# Patient Record
Sex: FEMALE | Race: BLACK OR AFRICAN AMERICAN | Employment: FULL TIME | URBAN - METROPOLITAN AREA
[De-identification: names, ages, dates, MRNs, and addresses within clinical notes are randomized per-mention and may not be internally consistent; named-entity substitution may affect disease eponyms.]

---

## 2018-01-12 ENCOUNTER — HOSPITAL ENCOUNTER (EMERGENCY)
Age: 28
Discharge: HOME | End: 2018-01-12
Attending: FAMILY MEDICINE
Payer: MEDICAID

## 2018-01-12 VITALS
WEIGHT: 200 LBS | BODY MASS INDEX: 35.44 KG/M2 | HEIGHT: 63 IN | SYSTOLIC BLOOD PRESSURE: 122 MMHG | TEMPERATURE: 97.9 F | OXYGEN SATURATION: 99 % | DIASTOLIC BLOOD PRESSURE: 72 MMHG | RESPIRATION RATE: 20 BRPM | HEART RATE: 81 BPM

## 2018-01-12 DIAGNOSIS — R10.10 PAIN OF UPPER ABDOMEN: Primary | ICD-10-CM

## 2018-01-12 LAB
ABSOLUTE EOS #: 0 K/UL (ref 0–0.4)
ABSOLUTE IMMATURE GRANULOCYTE: NORMAL K/UL (ref 0–0.3)
ABSOLUTE LYMPH #: 2 K/UL (ref 1–4.8)
ABSOLUTE MONO #: 0.2 K/UL (ref 0.2–0.8)
ALBUMIN SERPL-MCNC: 4.1 G/DL (ref 3.5–5.2)
ALBUMIN/GLOBULIN RATIO: ABNORMAL (ref 1–2.5)
ALP BLD-CCNC: 71 U/L (ref 35–104)
ALT SERPL-CCNC: 6 U/L (ref 5–33)
ANION GAP SERPL CALCULATED.3IONS-SCNC: 13 MMOL/L (ref 9–17)
AST SERPL-CCNC: 12 U/L
BASOPHILS # BLD: 0 % (ref 0–2)
BASOPHILS ABSOLUTE: 0 K/UL (ref 0–0.2)
BILIRUB SERPL-MCNC: 0.57 MG/DL (ref 0.3–1.2)
BUN BLDV-MCNC: 5 MG/DL (ref 6–20)
BUN/CREAT BLD: 7 (ref 9–20)
CALCIUM SERPL-MCNC: 9.2 MG/DL (ref 8.6–10.4)
CHLORIDE BLD-SCNC: 102 MMOL/L (ref 98–107)
CO2: 25 MMOL/L (ref 20–31)
CREAT SERPL-MCNC: 0.69 MG/DL (ref 0.5–0.9)
DIFFERENTIAL TYPE: NORMAL
EOSINOPHILS RELATIVE PERCENT: 1 % (ref 1–4)
GFR AFRICAN AMERICAN: >60 ML/MIN
GFR NON-AFRICAN AMERICAN: >60 ML/MIN
GFR SERPL CREATININE-BSD FRML MDRD: ABNORMAL ML/MIN/{1.73_M2}
GFR SERPL CREATININE-BSD FRML MDRD: ABNORMAL ML/MIN/{1.73_M2}
GLUCOSE BLD-MCNC: 83 MG/DL (ref 70–99)
HCT VFR BLD CALC: 42.7 % (ref 36–46)
HEMOGLOBIN: 14 G/DL (ref 12–16)
IMMATURE GRANULOCYTES: NORMAL %
LIPASE: 17 U/L (ref 13–60)
LYMPHOCYTES # BLD: 36 % (ref 24–44)
MCH RBC QN AUTO: 28 PG (ref 26–34)
MCHC RBC AUTO-ENTMCNC: 32.7 G/DL (ref 31–37)
MCV RBC AUTO: 85.4 FL (ref 80–100)
MONOCYTES # BLD: 4 % (ref 1–7)
PDW BLD-RTO: 13.1 % (ref 11.5–14.5)
PLATELET # BLD: 262 K/UL (ref 130–400)
PLATELET ESTIMATE: NORMAL
PMV BLD AUTO: NORMAL FL (ref 6–12)
POTASSIUM SERPL-SCNC: 4.1 MMOL/L (ref 3.7–5.3)
RBC # BLD: 4.99 M/UL (ref 4–5.2)
RBC # BLD: NORMAL 10*6/UL
SEG NEUTROPHILS: 59 % (ref 36–66)
SEGMENTED NEUTROPHILS ABSOLUTE COUNT: 3.3 K/UL (ref 1.8–7.7)
SODIUM BLD-SCNC: 140 MMOL/L (ref 135–144)
TOTAL PROTEIN: 7.5 G/DL (ref 6.4–8.3)
WBC # BLD: 5.5 K/UL (ref 3.5–11)
WBC # BLD: NORMAL 10*3/UL

## 2018-01-12 PROCEDURE — 83690 ASSAY OF LIPASE: CPT

## 2018-01-12 PROCEDURE — 99284 EMERGENCY DEPT VISIT MOD MDM: CPT

## 2018-01-12 PROCEDURE — 96374 THER/PROPH/DIAG INJ IV PUSH: CPT

## 2018-01-12 PROCEDURE — 2580000003 HC RX 258: Performed by: FAMILY MEDICINE

## 2018-01-12 PROCEDURE — 85025 COMPLETE CBC W/AUTO DIFF WBC: CPT

## 2018-01-12 PROCEDURE — 84703 CHORIONIC GONADOTROPIN ASSAY: CPT

## 2018-01-12 PROCEDURE — 6360000002 HC RX W HCPCS: Performed by: FAMILY MEDICINE

## 2018-01-12 PROCEDURE — 80053 COMPREHEN METABOLIC PANEL: CPT

## 2018-01-12 PROCEDURE — 81003 URINALYSIS AUTO W/O SCOPE: CPT

## 2018-01-12 RX ORDER — HYDROCODONE BITARTRATE AND ACETAMINOPHEN 5; 325 MG/1; MG/1
1 TABLET ORAL EVERY 8 HOURS PRN
Qty: 6 TABLET | Refills: 0 | Status: SHIPPED | OUTPATIENT
Start: 2018-01-12 | End: 2018-01-15

## 2018-01-12 RX ORDER — MORPHINE SULFATE 2 MG/ML
2 INJECTION, SOLUTION INTRAMUSCULAR; INTRAVENOUS
Status: DISCONTINUED | OUTPATIENT
Start: 2018-01-12 | End: 2018-01-12 | Stop reason: HOSPADM

## 2018-01-12 RX ORDER — ONDANSETRON 4 MG/1
4 TABLET, ORALLY DISINTEGRATING ORAL EVERY 8 HOURS PRN
Qty: 20 TABLET | Refills: 0 | Status: SHIPPED | OUTPATIENT
Start: 2018-01-12 | End: 2018-01-14

## 2018-01-12 RX ORDER — 0.9 % SODIUM CHLORIDE 0.9 %
1000 INTRAVENOUS SOLUTION INTRAVENOUS ONCE
Status: COMPLETED | OUTPATIENT
Start: 2018-01-12 | End: 2018-01-12

## 2018-01-12 RX ORDER — MORPHINE SULFATE 4 MG/ML
4 INJECTION, SOLUTION INTRAMUSCULAR; INTRAVENOUS
Status: DISCONTINUED | OUTPATIENT
Start: 2018-01-12 | End: 2018-01-12

## 2018-01-12 RX ADMIN — MORPHINE SULFATE 2 MG: 2 INJECTION, SOLUTION INTRAMUSCULAR; INTRAVENOUS at 14:05

## 2018-01-12 RX ADMIN — SODIUM CHLORIDE 1000 ML: 9 INJECTION, SOLUTION INTRAVENOUS at 14:05

## 2018-01-12 ASSESSMENT — ENCOUNTER SYMPTOMS
ALLERGIC/IMMUNOLOGIC NEGATIVE: 1
EYES NEGATIVE: 1
ABDOMINAL PAIN: 1
NAUSEA: 1
RESPIRATORY NEGATIVE: 1
VOMITING: 1
ANAL BLEEDING: 1

## 2018-01-12 ASSESSMENT — PAIN DESCRIPTION - LOCATION
LOCATION: ABDOMEN
LOCATION: ABDOMEN

## 2018-01-12 ASSESSMENT — PAIN SCALES - GENERAL
PAINLEVEL_OUTOF10: 10
PAINLEVEL_OUTOF10: 2
PAINLEVEL_OUTOF10: 10

## 2018-01-12 ASSESSMENT — PAIN DESCRIPTION - FREQUENCY: FREQUENCY: CONTINUOUS

## 2018-01-12 NOTE — LETTER
[facility] ED  [facility] [address]  Phone: [phone]             January 12, 2018    Patient: Servando Harris   YOB: 1990   Date of Visit: 1/12/2018       To Whom It May Concern:    Servando Harris was seen and treated in our emergency department on 1/12/2018. She may return to work on 1/13/2018.       Sincerely,             Signature:__________________________________

## 2018-01-12 NOTE — ED NOTES
Assessment complain of a lot of abdominal pain. Abdomen softly obese with active bowel sounds. States putting a heating pad on abdomen and now pain is exacerbated. Educated on not using a heating pad. Color pink skin warm and dry. She moans and ashley, guards abdomen otherwise in no acute distress.      Lex Sr RN  01/12/18 [id number]

## 2018-01-13 LAB — HCG, PREGNANCY URINE (POC): NEGATIVE

## 2024-06-04 ENCOUNTER — HOSPITAL ENCOUNTER (OUTPATIENT)
Age: 34
Setting detail: OBSERVATION
Discharge: HOME | End: 2024-06-05
Attending: EMERGENCY MEDICINE | Admitting: EMERGENCY MEDICINE
Payer: COMMERCIAL

## 2024-06-04 ENCOUNTER — APPOINTMENT (OUTPATIENT)
Dept: GENERAL RADIOLOGY | Age: 34
End: 2024-06-04
Payer: COMMERCIAL

## 2024-06-04 ENCOUNTER — APPOINTMENT (OUTPATIENT)
Dept: CT IMAGING | Age: 34
End: 2024-06-04
Payer: COMMERCIAL

## 2024-06-04 DIAGNOSIS — M25.552 LEFT HIP PAIN: Primary | ICD-10-CM

## 2024-06-04 LAB — HCG SERPL QL: NEGATIVE

## 2024-06-04 PROCEDURE — 6360000002 HC RX W HCPCS

## 2024-06-04 PROCEDURE — G0378 HOSPITAL OBSERVATION PER HR: HCPCS

## 2024-06-04 PROCEDURE — 73700 CT LOWER EXTREMITY W/O DYE: CPT

## 2024-06-04 PROCEDURE — 93005 ELECTROCARDIOGRAM TRACING: CPT | Performed by: EMERGENCY MEDICINE

## 2024-06-04 PROCEDURE — 96375 TX/PRO/DX INJ NEW DRUG ADDON: CPT

## 2024-06-04 PROCEDURE — 96372 THER/PROPH/DIAG INJ SC/IM: CPT

## 2024-06-04 PROCEDURE — 73502 X-RAY EXAM HIP UNI 2-3 VIEWS: CPT

## 2024-06-04 PROCEDURE — 96374 THER/PROPH/DIAG INJ IV PUSH: CPT

## 2024-06-04 PROCEDURE — 84703 CHORIONIC GONADOTROPIN ASSAY: CPT

## 2024-06-04 PROCEDURE — 99285 EMERGENCY DEPT VISIT HI MDM: CPT

## 2024-06-04 RX ORDER — ORPHENADRINE CITRATE 30 MG/ML
60 INJECTION INTRAMUSCULAR; INTRAVENOUS ONCE
Status: COMPLETED | OUTPATIENT
Start: 2024-06-04 | End: 2024-06-04

## 2024-06-04 RX ORDER — ONDANSETRON 4 MG/1
4 TABLET, ORALLY DISINTEGRATING ORAL EVERY 8 HOURS PRN
Status: DISCONTINUED | OUTPATIENT
Start: 2024-06-04 | End: 2024-06-05 | Stop reason: HOSPADM

## 2024-06-04 RX ORDER — SODIUM CHLORIDE 0.9 % (FLUSH) 0.9 %
5-40 SYRINGE (ML) INJECTION PRN
Status: DISCONTINUED | OUTPATIENT
Start: 2024-06-04 | End: 2024-06-05 | Stop reason: HOSPADM

## 2024-06-04 RX ORDER — MORPHINE SULFATE 4 MG/ML
4 INJECTION, SOLUTION INTRAMUSCULAR; INTRAVENOUS ONCE
Status: COMPLETED | OUTPATIENT
Start: 2024-06-04 | End: 2024-06-04

## 2024-06-04 RX ORDER — ACETAMINOPHEN 325 MG/1
650 TABLET ORAL EVERY 4 HOURS PRN
Status: DISCONTINUED | OUTPATIENT
Start: 2024-06-04 | End: 2024-06-05 | Stop reason: HOSPADM

## 2024-06-04 RX ORDER — SODIUM CHLORIDE 9 MG/ML
INJECTION, SOLUTION INTRAVENOUS PRN
Status: DISCONTINUED | OUTPATIENT
Start: 2024-06-04 | End: 2024-06-05 | Stop reason: HOSPADM

## 2024-06-04 RX ORDER — KETOROLAC TROMETHAMINE 30 MG/ML
30 INJECTION, SOLUTION INTRAMUSCULAR; INTRAVENOUS ONCE
Status: COMPLETED | OUTPATIENT
Start: 2024-06-04 | End: 2024-06-04

## 2024-06-04 RX ORDER — ONDANSETRON 2 MG/ML
4 INJECTION INTRAMUSCULAR; INTRAVENOUS EVERY 6 HOURS PRN
Status: DISCONTINUED | OUTPATIENT
Start: 2024-06-04 | End: 2024-06-05 | Stop reason: HOSPADM

## 2024-06-04 RX ORDER — SODIUM CHLORIDE 0.9 % (FLUSH) 0.9 %
5-40 SYRINGE (ML) INJECTION EVERY 12 HOURS SCHEDULED
Status: DISCONTINUED | OUTPATIENT
Start: 2024-06-05 | End: 2024-06-05 | Stop reason: HOSPADM

## 2024-06-04 RX ADMIN — ORPHENADRINE CITRATE 60 MG: 60 INJECTION INTRAMUSCULAR; INTRAVENOUS at 19:26

## 2024-06-04 RX ADMIN — KETOROLAC TROMETHAMINE 30 MG: 30 INJECTION, SOLUTION INTRAMUSCULAR; INTRAVENOUS at 19:26

## 2024-06-04 RX ADMIN — MORPHINE SULFATE 4 MG: 4 INJECTION INTRAVENOUS at 22:04

## 2024-06-04 ASSESSMENT — ENCOUNTER SYMPTOMS
BACK PAIN: 0
NAUSEA: 0
VOMITING: 0
ABDOMINAL PAIN: 0
SHORTNESS OF BREATH: 0

## 2024-06-04 ASSESSMENT — LIFESTYLE VARIABLES
HOW OFTEN DO YOU HAVE A DRINK CONTAINING ALCOHOL: NEVER
HOW MANY STANDARD DRINKS CONTAINING ALCOHOL DO YOU HAVE ON A TYPICAL DAY: PATIENT DOES NOT DRINK

## 2024-06-04 ASSESSMENT — PAIN SCALES - GENERAL
PAINLEVEL_OUTOF10: 7
PAINLEVEL_OUTOF10: 6
PAINLEVEL_OUTOF10: 8
PAINLEVEL_OUTOF10: 7
PAINLEVEL_OUTOF10: 5

## 2024-06-04 ASSESSMENT — PAIN DESCRIPTION - ORIENTATION
ORIENTATION: LEFT
ORIENTATION_2: LEFT

## 2024-06-04 ASSESSMENT — PAIN - FUNCTIONAL ASSESSMENT: PAIN_FUNCTIONAL_ASSESSMENT: 0-10

## 2024-06-04 ASSESSMENT — PAIN DESCRIPTION - LOCATION
LOCATION_2: HIP
LOCATION: CHEST
LOCATION: CHEST;HIP

## 2024-06-04 ASSESSMENT — PAIN DESCRIPTION - INTENSITY: RATING_2: 8

## 2024-06-04 NOTE — ED PROVIDER NOTES
[facility] ED  Emergency Department Encounter  Emergency Medicine Resident     Pt Name:Yadira Jara  MRN: [id number]  Birthdate 1990  Date of evaluation: 6/4/24  PCP:  Jens Longo APRN - NP  Note Started: 6:45 PM EDT      CHIEF COMPLAINT       Chief Complaint   Patient presents with    Hip Pain     Left, chronic, resulted in fall today    Chest Pain       HISTORY OF PRESENT ILLNESS  (Location/Symptom, Timing/Onset, Context/Setting, Quality, Duration, Modifying Factors, Severity.)      Yadira Jara is a 33 y.o. female PMH PE who presents with left hip pain.    Patient states while she was reaching up for something at work, states that her left leg gave out.  States that she did fall [place] did not hit her head, did not lose consciousness.  Patient states that since she fell the pain in the left hip has gotten significantly worse.  Rates the pain as 5/10, states it is a burning/aching pain.  Denies any radiation of the pain.  States she is able to ambulate but does cause some pain.  Patient states that after she fell she developed some chest pain.  Rates the pain as a 5/10, states the pain has improved.  Describes the pain as a tightness, also having some shortness of breath.  Patient is not on any current anticoagulation, does have a past medical history significant for PE.    PAST MEDICAL / SURGICAL / SOCIAL / FAMILY HISTORY      has a past medical history of Pulmonary embolus (HCC).       has a past surgical history that includes Hysterectomy.      Social History     Socioeconomic History    Marital status:      Spouse name: Not on file    Number of children: Not on file    Years of education: Not on file    Highest education level: Not on file   Occupational History    Not on file   Tobacco Use    Smoking status: Every Day     Current packs/day: 0.50     Average packs/day: 0.5 packs/day for 5.0 years (2.5 ttl pk-yrs)     Types: Cigarettes    Smokeless tobacco: Never   Substance  Movements: Extraocular movements intact.      Pupils: Pupils are equal, round, and reactive to light.   Cardiovascular:      Rate and Rhythm: Normal rate and regular rhythm.      Pulses: Normal pulses.      Heart sounds: Normal heart sounds. No murmur heard.  Pulmonary:      Effort: Pulmonary effort is normal. No respiratory distress.      Breath sounds: Normal breath sounds. No wheezing.   Abdominal:      General: Abdomen is flat. There is no distension.      Palpations: Abdomen is soft.      Tenderness: There is no abdominal tenderness.   Musculoskeletal:      Comments: Tenderness to palpation over the anterior aspect of the left hip, no contusions or abrasions appreciated over the anterior aspect of the hip.  Patient does have pain with internal rotation and external rotation of the left hip, does have pain with flexion extension of the left hip.  Patient does not have any cervical, thoracic or lumbar spinal tenderness to palpation, no signs of traumatic injury to the head.           DDX/DIAGNOSTIC RESULTS / EMERGENCY DEPARTMENT COURSE / MDM     Medical Decision Making  33-year-old female presents to the emergency department with left hip pain.  Does have a history of SCPE.   States that she had a fall while at work when she was reaching up to get something, states the fall was mechanical patient did not have chest pain or shortness of breath dizziness prior to the fall.  Patient states that after the fall then she did get shortness of breath and chest pain however the symptoms have since resolved.  Patient was also complaining of a rash in the left lower extremity, however the rash is nonconcerning, not soft and not vesicular and blanching.  On exam patient does have significant tenderness to palpation over the anterior aspect of the left hip, no signs of traumatic injury contusions or abrasions.  Patient does have pain with internal rotation and external rotation of the left hip.  Patient is neurovascularly

## 2024-06-04 NOTE — ED PROVIDER NOTES
[facility] OBSERVATION UNIT  Emergency Department  Emergency Medicine Resident Sign-out     Care of Yadira Jara was assumed from Dr. Robertson and is being seen for Hip Pain (Left, chronic, resulted in fall today) and Chest Pain  .  The patient's initial evaluation and plan have been discussed with the prior provider who initially evaluated the patient.     EMERGENCY DEPARTMENT COURSE / MEDICAL DECISION MAKING:       MEDICATIONS GIVEN:  Orders Placed This Encounter   Medications    ketorolac (TORADOL) injection 30 mg    orphenadrine (NORFLEX) injection 60 mg    morphine injection 4 mg    sodium chloride flush 0.9 % injection 5-40 mL    sodium chloride flush 0.9 % injection 5-40 mL    0.9 % sodium chloride infusion    acetaminophen (TYLENOL) tablet 650 mg    OR Linked Order Group     ondansetron (ZOFRAN-ODT) disintegrating tablet 4 mg     ondansetron (ZOFRAN) injection 4 mg       LABS / RADIOLOGY:     Labs Reviewed   HCG, SERUM, QUALITATIVE       XR HIP 2-3 VW W PELVIS LEFT    Result Date: 6/4/2024  EXAMINATION: ONE XRAY VIEW OF THE PELVIS AND TWO XRAY VIEWS LEFT HIP 6/4/2024 6:10 pm COMPARISON: None. HISTORY: ORDERING SYSTEM PROVIDED HISTORY: L hip pain after a fall TECHNOLOGIST PROVIDED HISTORY: L hip pain after a fall FINDINGS: Mineralization appears normal.  The joint spaces are unremarkable.  No fracture, dislocation or focal bone lesion is identified.     No acute fracture or dislocation.       RECENT VITALS:     Temp: 97 °F (36.1 °C),  Pulse: 51, Respirations: 18, BP: 100/65, SpO2: 100 %      This patient is a 33 y.o. Female with history of SCFE, left hip pain after a fall from work.  Chronic pain, significant worse after fall.  Able to ambulate however with a lot of pain.  Chest pain and shortness of breath after fall.  History of PE.  Not on any anticoagulant.       ED Course as of 06/05/24 0952   Tue Jun 04, 2024 1941 XR HIP 2-3 VW W PELVIS LEFT  IMPRESSION:  No acute fracture or dislocation.   [MW]   8136  Patient reevaluated, reports that the pain is slightly better however she is still in pain.  Discussed with the patient additional time and then ambulation in the department versus admission to the observation unit with physical therapy and Occupational Therapy in the morning.  Patient would like to think prior to making a decision. [HS]   0819 Patient reassessed; reports that she is not comfortable going home. Patient agreeable to obs admission with PT/OT eval [HS]      ED Course User Index  [HS] Yogi Stovall MD  [MW] Sedrick Montilla MD       OUTSTANDING TASKS / RECOMMENDATIONS:    CT Hip  Dispo     FINAL IMPRESSION:     1. Left hip pain        DISPOSITION:         DISPOSITION:  []  Discharge   []  Transfer -    [x]  Admission -     []  Against Medical Advice   []  Eloped   FOLLOW-UP: No follow-up provider specified.   DISCHARGE MEDICATIONS: Current Discharge Medication List             Yogi Stovall MD  Emergency Medicine Resident  [facility]

## 2024-06-04 NOTE — ED NOTES
The following labs were labeled with appropriate pt sticker and tubed to lab:     [x] Blue     [x] Lavender   [] on ice  [x] Green/yellow  [x] Green/black [] on ice  [] Grey  [] on ice  [] Yellow  [] Red  [] Pink  [] Type/ Screen  [] ABG  [] VBG

## 2024-06-04 NOTE — ED NOTES
Pt to ED via triage a/o x4 with c/o left hip pain, chest pain, rash and leg swelling. Pt reports she noticed last night her legs were swollen. Pt stated she was at work today when her left hip gave out and she fell to her bottom. Pt denies any head injury. Pt denies any current chest pain. Pt denies any medical issues or home meds. Pt placed on monitor call light is in reach

## 2024-06-05 VITALS
WEIGHT: 250.22 LBS | BODY MASS INDEX: 44.34 KG/M2 | TEMPERATURE: 97 F | OXYGEN SATURATION: 100 % | RESPIRATION RATE: 18 BRPM | HEART RATE: 51 BPM | DIASTOLIC BLOOD PRESSURE: 65 MMHG | HEIGHT: 63 IN | SYSTOLIC BLOOD PRESSURE: 100 MMHG

## 2024-06-05 LAB
EKG ATRIAL RATE: 77 BPM
EKG P AXIS: 48 DEGREES
EKG P-R INTERVAL: 158 MS
EKG Q-T INTERVAL: 390 MS
EKG QRS DURATION: 88 MS
EKG QTC CALCULATION (BAZETT): 441 MS
EKG R AXIS: 12 DEGREES
EKG T AXIS: 10 DEGREES
EKG VENTRICULAR RATE: 77 BPM

## 2024-06-05 PROCEDURE — 6370000000 HC RX 637 (ALT 250 FOR IP): Performed by: EMERGENCY MEDICINE

## 2024-06-05 PROCEDURE — G0378 HOSPITAL OBSERVATION PER HR: HCPCS

## 2024-06-05 PROCEDURE — 97166 OT EVAL MOD COMPLEX 45 MIN: CPT

## 2024-06-05 PROCEDURE — 97162 PT EVAL MOD COMPLEX 30 MIN: CPT

## 2024-06-05 PROCEDURE — 97530 THERAPEUTIC ACTIVITIES: CPT

## 2024-06-05 PROCEDURE — 2580000003 HC RX 258

## 2024-06-05 PROCEDURE — 97535 SELF CARE MNGMENT TRAINING: CPT

## 2024-06-05 RX ORDER — OXYCODONE HYDROCHLORIDE 5 MG/1
5 TABLET ORAL EVERY 6 HOURS PRN
Qty: 12 TABLET | Refills: 0 | Status: SHIPPED | OUTPATIENT
Start: 2024-06-05 | End: 2024-06-08

## 2024-06-05 RX ORDER — IBUPROFEN 600 MG/1
600 TABLET ORAL EVERY 6 HOURS PRN
Qty: 60 TABLET | Refills: 1 | Status: SHIPPED | OUTPATIENT
Start: 2024-06-05

## 2024-06-05 RX ORDER — OXYCODONE HYDROCHLORIDE 5 MG/1
5 TABLET ORAL ONCE
Status: COMPLETED | OUTPATIENT
Start: 2024-06-05 | End: 2024-06-05

## 2024-06-05 RX ORDER — CYCLOBENZAPRINE HCL 5 MG
5 TABLET ORAL 3 TIMES DAILY PRN
Qty: 30 TABLET | Refills: 0 | Status: SHIPPED | OUTPATIENT
Start: 2024-06-05 | End: 2024-06-15

## 2024-06-05 RX ADMIN — SODIUM CHLORIDE, PRESERVATIVE FREE 10 ML: 5 INJECTION INTRAVENOUS at 12:35

## 2024-06-05 RX ADMIN — OXYCODONE HYDROCHLORIDE 5 MG: 5 TABLET ORAL at 12:32

## 2024-06-05 ASSESSMENT — PAIN SCALES - GENERAL
PAINLEVEL_OUTOF10: 8
PAINLEVEL_OUTOF10: 4

## 2024-06-05 ASSESSMENT — PAIN DESCRIPTION - LOCATION: LOCATION: HIP

## 2024-06-05 ASSESSMENT — PAIN - FUNCTIONAL ASSESSMENT: PAIN_FUNCTIONAL_ASSESSMENT: PREVENTS OR INTERFERES SOME ACTIVE ACTIVITIES AND ADLS

## 2024-06-05 ASSESSMENT — LIFESTYLE VARIABLES
HOW MANY STANDARD DRINKS CONTAINING ALCOHOL DO YOU HAVE ON A TYPICAL DAY: PATIENT DOES NOT DRINK
HOW OFTEN DO YOU HAVE A DRINK CONTAINING ALCOHOL: NEVER

## 2024-06-05 ASSESSMENT — PAIN DESCRIPTION - DESCRIPTORS: DESCRIPTORS: ACHING

## 2024-06-05 ASSESSMENT — PAIN DESCRIPTION - ORIENTATION: ORIENTATION: LEFT

## 2024-06-05 NOTE — CARE COORDINATION
DISCHARGE PLANNING EVALUATION: OP/OBSERVATION        6/5/24, 2:04 PM EDT    Yadira Jara         Location: OBS 21/21-1   Reason for hospitalization: Left hip pain [M25.552]     CM Services requested for transitional needs.     PCP: Jens Longo APRN - NP    Transportation/Food Security/Housekeeping Addressed:  No issues identified.     Equipment needs: Wheeled walker     Case Management Services Information Letter Provided [x]    Transition plan: Obs: Patient states that she will use [facility] if insurance allows.  Writer called Carlos from [facility] to confirm that they can accept ins.  Order, face sheet, and face to face documentation is faxed to [facility].  Patient is instructed to call when she gets home to arrange for delivery.  Patient agrees.

## 2024-06-05 NOTE — PROGRESS NOTES
CLINICAL PHARMACY NOTE: MEDS TO BEDS    Total # of Prescriptions Filled: 3   The following medications were delivered to the patient:  IBU 600MG   FLEXERIL 5MG   OXY 5MG     Additional Documentation:

## 2024-06-05 NOTE — ED NOTES
ED to inpatient nurses report      Chief Complaint:  Chief Complaint   Patient presents with    Hip Pain     Left, chronic, resulted in fall today    Chest Pain     Present to ED from: home    MOA:     LOC: alert and orientated to name, place, date  Mobility: Independent  Oxygen Baseline: RA    Current needs required: RA   Pending ED orders: none  Present condition: stable    Why did the patient come to the ED? Pt to ED via triage a/o x4 with c/o left hip pain, chest pain, rash and leg swelling. Pt reports she noticed last night her legs were swollen. Pt stated she was at work today when her left hip gave out and she fell to her bottom. Pt denies any head injury. Pt denies any current chest pain. Pt denies any medical issues or home meds. Pt placed on monitor call light is in reach   What is the plan? Admission  Any procedures or intervention occur? CT, Xray, Labs  Any safety concerns?? Fall Risk     Mental Status:  Level of Consciousness: Alert (0)    Psych Assessment:   Psychosocial  Psychosocial (WDL): Within Defined Limits  Vital signs   Vitals:    06/04/24 2227 06/04/24 2229 06/04/24 2242 06/04/24 2257   BP:       Pulse: 79 68 65 64   Resp:       Temp:       SpO2: 99% 99% 97% 99%   Weight:            Vitals:  Patient Vitals for the past 24 hrs:   BP Temp Pulse Resp SpO2 Weight   06/04/24 2257 -- -- 64 -- 99 % --   06/04/24 2242 -- -- 65 -- 97 % --   06/04/24 2229 -- -- 68 -- 99 % --   06/04/24 2227 -- -- 79 -- 99 % --   06/04/24 2219 -- -- 64 -- 99 % --   06/04/24 2209 -- -- 65 -- 98 % --   06/04/24 2202 118/89 -- 66 -- 100 % --   06/04/24 2151 -- -- 61 -- 100 % --   06/04/24 2145 (!) 121/97 -- 67 -- 99 % --   06/04/24 2130 115/75 -- 64 -- 100 % --   06/04/24 2122 (!) 149/125 -- 68 -- 100 % --   06/04/24 2100 108/73 -- 83 -- 98 % --   06/04/24 2039 111/65 -- 87 -- 100 % --   06/04/24 2010 115/76 -- 65 -- 96 % --   06/04/24 2003 120/76 -- 69 -- 96 % --   06/04/24 1943 127/80 -- 68 -- 98 % --   06/04/24 1933  121/75 -- 62 -- 98 % --   06/04/24 1913 114/72 -- 67 -- 99 % --   06/04/24 1912 -- -- 69 -- 97 % --   06/04/24 1900 119/85 -- 66 -- 97 % --   06/04/24 1832 127/83 -- 69 -- 98 % --   06/04/24 1822 130/83 -- 64 -- 99 % --   06/04/24 1748 (!) 138/94 97.5 °F (36.4 °C) 82 18 99 % 108.9 kg (240 lb)      Visit Vitals  /89   Pulse 64   Temp 97.5 °F (36.4 °C)   Resp 18   Wt 108.9 kg (240 lb)   SpO2 99%   BMI 42.51 kg/m²        LDAs:   Peripheral IV 06/04/24 Left Antecubital (Active)   Site Assessment Clean, dry & intact 06/04/24 1837   Line Status Blood return noted 06/04/24 1837       Ambulatory Status:  Presents to emergency department  because of falls (Syncope, seizure, or loss of consciousness): Yes, Age > 70: No, Altered Mental Status, Intoxication with alcohol or substance confusion (Disorientation, impaired judgment, poor safety awaremess, or inability to follow instructions): No, Impaired Mobility: Ambulates or transfers with assistive devices or assistance; Unable to ambulate or transer.: Yes, Nursing Judgement: Yes    Diagnosis:  DISPOSITION Admitted 06/04/2024 10:58:52 PM   Final diagnoses:   Left hip pain        Consults:  None     Treatment Team:   Treatment Team: Attending Provider: Edwin Braga MD; Registered Nurse: Jose G Wright RN; Resident: Yogi Stovall MD    Treatment:  ED Course as of 06/04/24 2300 Tue Jun 04, 2024   1941 XR HIP 2-3 VW W PELVIS LEFT  IMPRESSION:  No acute fracture or dislocation.   [MW]   2220 Patient reevaluated, reports that the pain is slightly better however she is still in pain.  Discussed with the patient additional time and then ambulation in the department versus admission to the observation unit with physical therapy and Occupational Therapy in the morning.  Patient would like to think prior to making a decision. [HS]      ED Course User Index  [HS] Yogi Stovall MD  [MW] Sedrick Montilla MD          Skin Assessment:        Pain Score:  Pain Assessment  Pain

## 2024-06-05 NOTE — PROGRESS NOTES
Physical Therapy  Facility/Department: [facility] OBSERVATION UNIT  Physical Therapy Initial Assessment    Name: Yadira Jara  : 1990  MRN: [id number]  Date of Service: 2024    Discharge Recommendations:  Patient would benefit from continued therapy after discharge   PT Equipment Recommendations  Equipment Needed: Yes  Mobility Devices: Walker  Walker: Rolling      Patient Diagnosis(es): The encounter diagnosis was Left hip pain.  Past Medical History:  has a past medical history of Pulmonary embolus (HCC).  Past Surgical History:  has a past surgical history that includes Hysterectomy.    Assessment   Body Structures, Functions, Activity Limitations Requiring Skilled Therapeutic Intervention: Decreased strength;Decreased endurance;Increased pain  Assessment: The pt ambulated 10 ft with a RW x CGA. She was limited due to a c/o increased pain in her L LE with mobilization.She could benefit from a continuation of PT for gait , strengthening and functional mobility prior to her DC  Therapy Prognosis: Good  Decision Making: Medium Complexity  Requires PT Follow-Up: Yes  Activity Tolerance  Activity Tolerance: Patient limited by fatigue;Patient limited by endurance;Patient limited by pain     Plan   Physical Therapy Plan  General Plan: 6-7 times per week  Current Treatment Recommendations: Strengthening, Functional mobility training, Transfer training, Endurance training, Gait training, Stair training, Safety education & training, Therapeutic activities  Safety Devices  Type of Devices: Gait belt, Patient at risk for falls, Left in bed, Call light within reach, Nurse notified  Restraints  Restraints Initially in Place: No     Restrictions  Restrictions/Precautions  Restrictions/Precautions: Fall Risk, General Precautions  Required Braces or Orthoses?: No  Position Activity Restriction  Other position/activity restrictions: up with assist     Subjective   General  Patient assessed for rehabilitation services?:  SBA  Short Term Goal 3: ascend/descend 4 steps with SBA  Short Term Goal 4: 20 min strengthening exercises x SBA       Education  Patient Education  Education Given To: Patient  Education Provided: Role of Therapy;Plan of Care  Education Method: Verbal;Demonstration  Barriers to Learning: None  Education Outcome: Verbalized understanding      Therapy Time   Individual Concurrent Group Co-treatment   Time In 1333         Time Out 1357         Minutes 24             1 of 10    Kings Basilio, PT

## 2024-06-05 NOTE — PLAN OF CARE
Problem: Discharge Planning  Goal: Discharge to home or other facility with appropriate resources  6/5/2024 1053 by Cantu, Joseph, RN  Outcome: Progressing  6/5/2024 0020 by Judi Stoll RN  Outcome: Progressing     Problem: Pain  Goal: Verbalizes/displays adequate comfort level or baseline comfort level  6/5/2024 1053 by Cantu, Joseph, RN  Outcome: Progressing  6/5/2024 0020 by Judi Stoll, RN  Outcome: Progressing     Problem: Safety - Adult  Goal: Free from fall injury  6/5/2024 1053 by Cantu, Joseph, RN  Outcome: Progressing  6/5/2024 0020 by Judi Stoll, RN  Outcome: Progressing

## 2024-06-05 NOTE — DISCHARGE INSTRUCTIONS
Your workup from the emergency department did not show any significant pathology but you were admitted to the observation unit for ongoing evaluation.    Your testing included xrays and CT scans       We no longer need to keep you in the hospital but that does not mean you are done being treated  -Take your prescribed medications as directed  -Follow-up with your primary care physician or the specialist designated or both as scheduled    Please return if your condition worsens or there are new symptoms    If there are concerns that have not been addressed while you have been in the hospital please let the discharge nurse know so the physician can be informed -it is easier to fix problems while you are still here    Bear weight as tolerated.  Use walker as needed.  Return for worsening problems or new issues.  Expect to feel better over the next 2 to 3 days      If you have questions after you have left the hospital please call the unit at  and ask for the observation resident on-call

## 2024-06-05 NOTE — PROGRESS NOTES
Occupational Therapy  Facility/Department: [facility] OBSERVATION UNIT  Occupational Therapy Initial Assessment    Name: Yadira Jaar  : 1990  MRN: [id number]  Date of Service: 2024  Chief Complaint   Patient presents with    Hip Pain     Left, chronic, resulted in fall today    Chest Pain       Discharge Recommendations:  Patient would benefit from continued therapy after discharge  OT Equipment Recommendations  Equipment Needed: Yes  Mobility Devices: Walker;ADL Assistive Devices;Wheelchair  Walker: Rolling  Wheelchair: Standard  ADL Assistive Devices: Long-handled Sponge;Reacher;Long-handled Shoe Horn;Sock-Aid Hard;Toileting - Raised Toilet Seat with arms;Transfer Tub Bench       Patient Diagnosis(es): The encounter diagnosis was Left hip pain.  Past Medical History:  has a past medical history of Pulmonary embolus (HCC).  Past Surgical History:  has a past surgical history that includes Hysterectomy.       Assessment   Performance deficits / Impairments: Decreased functional mobility ;Decreased endurance;Decreased ADL status;Decreased balance;Decreased high-level IADLs;Decreased strength  Prognosis: Good  Decision Making: Medium Complexity  REQUIRES OT FOLLOW-UP: Yes  Activity Tolerance  Activity Tolerance: Patient limited by pain        Plan   Occupational Therapy Plan  Times Per Week: 4-6x     Restrictions  Restrictions/Precautions  Restrictions/Precautions: Fall Risk, General Precautions  Required Braces or Orthoses?: No  Position Activity Restriction  Other position/activity restrictions: up w/ A    Subjective   General  Patient assessed for rehabilitation services?: Yes  Family / Caregiver Present: No  Diagnosis: L hip pain, rash, BLE edema  Subjective  Subjective: RN approved therapy session, pt states having 8/10 L hip pain-acute aching/burning, writer cecile pt's activity/distraction/emotional support given     Social/Functional History  Social/Functional History  Lives With: Family (sister)  Type  Independent  Grooming: Modified independent   UE Bathing: Supervision;Increased time to complete  LE Bathing: Moderate assistance;Increased time to complete  UE Dressing: Supervision;Increased time to complete  LE Dressing: Minimal assistance;Increased time to complete (Min A because of sock-aid/reacher, without AE pt would require max A or more)  Toileting: Minimal assistance;Increased time to complete  Additional Comments: Pt limited by L hip pain maintly this date however BUE strength 4/5 grossly, pt often moaning in pain with movements, pt supine in bed upon arrival laying on L hip which pt states decreases pain with pressure, on EOB pt fed self breakfast effectively using BUE's, pt transferred slowly to standing with assist, pt used RW for func mob around room only d/t pain limitations, pt sat and writer educated pt on AE-pt used reacher/sock aid to doff/don L sock successfully, pt demo'd poor attempt to scoot to L in bed higher using BUE's and RLE and pt returned to supine in awkward manner with mod A, pt boosted with [place] sheet and writer exited      Vision  Vision: Within Functional Limits  Hearing  Hearing: Within functional limits  Cognition  Overall Cognitive Status: [facility]  Cognition Comment: However, based on writer's observations pt may have been able to provide more effort into transfers, UE MMT, etc.  Orientation  Overall Orientation Status: Within Functional Limits  Orientation Level: Oriented X4          Education Given To: Patient  Education Provided: Role of Therapy;Plan of Care;Equipment  Education Method: Verbal  Barriers to Learning: None  Education Outcome: Verbalized understanding;Continued education needed      Hand Dominance  Hand Dominance: Right                                  AM-PAC - ADL  AM-PAC Daily Activity - Inpatient   How much help is needed for putting on and taking off regular lower body clothing?: A Little  How much help is needed for bathing (which includes washing,

## 2024-06-05 NOTE — PROGRESS NOTES
[facility]  CDU / OBSERVATION ENCOUNTER  ATTENDING NOTE     Yadira Jara was evaluated today and a DME order was entered for a wheeled walker with seat because she requires this to successfully complete daily living tasks of ambulating.  A wheeled walker with seat is necessary due to the patient's unsteady gait, upper body weakness, inability to  and ambulation device, ambulating only short distances by pushing a walker, and the need to sit for a short time before resuming ambulation.  These tasks cannot be completed with a lesser ambulation device such as a cane, crutch, or standard walker.  The need for this equipment was discussed with the patient and she understands and is in agreement.       Edwin Braga MD  Attending Emergency  Physician

## 2024-06-06 NOTE — H&P
sensation  Dermatological ROS - No lesions, No rash     (PQRS) Advance directives on face sheet per hospital policy. No change unless specifically mentioned in chart    PAST MEDICAL HISTORY    has a past medical history of Pulmonary embolus (HCC).    I have reviewed the past medical history with the patient and it is  pertinent to this complaint.      SURGICAL HISTORY      has a past surgical history that includes Hysterectomy.  I have reviewed and agree with Surgical History entered and it is  pertinent to this complaint.     CURRENT MEDICATIONS     No current facility-administered medications for this encounter.      All medication charted and reviewed.    ALLERGIES     has No Known Allergies.      FAMILY HISTORY     has no family status information on file.      family history is not on file.  The patient denies any pertinent family history.  I have reviewed and agree with the family history entered.  I have reviewed the Family History and it is not significant to the case    SOCIAL HISTORY      reports that she has been smoking cigarettes. She has a 2.5 pack-year smoking history. She has never used smokeless tobacco. She reports current alcohol use. She reports that she does not use drugs.  I have reviewed and agree with all Social.  There are no  concerns for substance abuse/use.    PHYSICAL EXAM     INITIAL VITALS:  height is 1.6 m (5' 3\") and weight is 113.5 kg (250 lb 3.6 oz). Her axillary temperature is 97 °F (36.1 °C). Her blood pressure is 100/65 and her pulse is 51. Her respiration is 18 and oxygen saturation is 100%.      CONSTITUTIONAL: AOx4, no apparent distress, appears stated age patient with some discomfort   HEAD: normocephalic, atraumatic   EYES: PERRLA, EOMI    ENT: moist mucous membranes, uvula midline   NECK: supple, symmetric   BACK: symmetric   LUNGS: clear to auscultation bilaterally   CARDIOVASCULAR: regular rate and rhythm, no murmurs, rubs or gallops   ABDOMEN: soft, non-tender,  grossly intact. Joint: No significant degenerative changes. No osseous erosions.     Negative for fracture.     XR HIP 2-3 VW W PELVIS LEFT    Result Date: 6/4/2024  EXAMINATION: ONE XRAY VIEW OF THE PELVIS AND TWO XRAY VIEWS LEFT HIP 6/4/2024 6:10 pm COMPARISON: None. HISTORY: ORDERING SYSTEM PROVIDED HISTORY: L hip pain after a fall TECHNOLOGIST PROVIDED HISTORY: L hip pain after a fall FINDINGS: Mineralization appears normal.  The joint spaces are unremarkable.  No fracture, dislocation or focal bone lesion is identified.     No acute fracture or dislocation.       LABS:  I have reviewed and interpreted all available lab results.  Labs Reviewed   HCG, SERUM, QUALITATIVE         CDU IMPRESSION / PLAN      Yadira Jara is a 33 y.o. female who presents with fall.    Imaging negative.  No evidence of electrical injury.  This included CT scanning of the hip and pelvis.  Patient and I reviewed films together  PT OT evaluation  DME for walker  Ready for home therapy  Continue home medications and pain control  Monitor vitals, labs, and imaging  DISPO: pending consults and clinical improvement    CONSULTS:    None    PROCEDURES:  Not indicated        PATIENT REFERRED TO:    Jens Longo APRN - NP  [address] [facility] [address]  [phone]    Follow up      [facility] [address]  [phone]  Follow up  call when you get home to have your walker delivered      --  Edwin Braga MD   Observation Physician    This dictation was generated by voice recognition computer software.  Although all attempts are made to edit the dictation for accuracy, there may be errors in the transcription that are not intended.

## 2024-06-06 NOTE — DISCHARGE SUMMARY
CDU Discharge Summary        Patient:  Yadira Jara  YOB: 1990    MRN: [id number]   Acct: [id number]    Primary Care Physician: Jens Longo APRN - NP    Admit date:  6/4/2024  6:03 PM  Discharge date: 6/5/2024  4:21 PM      Discharge Diagnoses:     1.)  Patient had fall with acute onset due to musculoskeletal injury.  Treated with anti-inflammatories and analgesics.  Patient's symptoms are improved with the plan to reevaluate as outpatient    Follow-up:  Call today/tomorrow for a follow up appointment with Jens Longo APRN - NP , or return to the Emergency Room with worsening symptoms    Stressed to patient the importance of following up with primary care doctor for further workup/management of symptoms.  Pt verbalizes understanding and agrees with plan.    Discharge Medication Changes:       Medication List        START taking these medications      cyclobenzaprine 5 MG tablet  Commonly known as: FLEXERIL  Take 1 tablet by mouth 3 times daily as needed for Muscle spasms     oxyCODONE 5 MG immediate release tablet  Commonly known as: Roxicodone  Take 1 tablet by mouth every 6 hours as needed for Pain for up to 3 days. Intended supply: 3 days. Take lowest dose possible to manage pain Max Daily Amount: 20 mg            CHANGE how you take these medications      ibuprofen 600 MG tablet  Commonly known as: ADVIL;MOTRIN  Take 1 tablet by mouth every 6 hours as needed for Pain  What changed:   when to take this  Another medication with the same name was removed. Continue taking this medication, and follow the directions you see here.            CONTINUE taking these medications      colchicine 0.6 MG tablet  Commonly known as: Colcrys  Take 1 tablet by mouth 2 times daily for 14 days            STOP taking these medications      acetaminophen-codeine 300-30 MG per tablet  Commonly known as: TYLENOL/CODEINE #3               Where to Get Your Medications        These medications were sent to [facility]  Joint: No significant degenerative changes. No osseous erosions.     Negative for fracture.     XR HIP 2-3 VW W PELVIS LEFT    Result Date: 6/4/2024  EXAMINATION: ONE XRAY VIEW OF THE PELVIS AND TWO XRAY VIEWS LEFT HIP 6/4/2024 6:10 pm COMPARISON: None. HISTORY: ORDERING SYSTEM PROVIDED HISTORY: L hip pain after a fall TECHNOLOGIST PROVIDED HISTORY: L hip pain after a fall FINDINGS: Mineralization appears normal.  The joint spaces are unremarkable.  No fracture, dislocation or focal bone lesion is identified.     No acute fracture or dislocation.           Physical Exam:    General appearance - NAD, AOx 3    Lungs -CTAB, no R/R/R  Heart - RRR, no M/R/G  Abdomen - Soft, NT/ND  Neurological:  MAEx4, No focal motor deficit, sensory loss  Extremities - Cap refil <2 sec in all ext., no edema  Skin -warm, dry      Hospital Course:  Clinical course has improved, labs and imaging reviewed.     Yadira Jara originally presented to the hospital on 6/4/2024  6:03 PM with acute fall.  At that time it was determined that She required further observation and advanced imaging.  Patient without to the skeletal system.  Patient with contusion.  Patient for ongoing supportive therapy and analgesia.  Patient requiring assistive devices to walk.  Patient discharged in improved after reevaluation observation unit. Labs and imaging were followed daily.  Imaging results as above.  She is medically stable to be discharged.       Disposition: Home    Patient stated that they will not drive themselves home from the hospital if they have gotten pain killers/ narcotics earlier that day and that they will arrange for transportation on their own or work with the  for a ride. Patient counseled NOT to drive while under the influence of narcotics/ pain killers.     Condition: Good    Patient stable and ready for discharge home. I have discussed plan of care with patient and they are in understanding. They were instructed to read

## 2024-06-24 ENCOUNTER — HOSPITAL ENCOUNTER (EMERGENCY)
Age: 34
Discharge: HOME | End: 2024-06-25
Attending: EMERGENCY MEDICINE
Payer: COMMERCIAL

## 2024-06-24 DIAGNOSIS — N61.0 CELLULITIS OF RIGHT BREAST: Primary | ICD-10-CM

## 2024-06-24 PROCEDURE — 96374 THER/PROPH/DIAG INJ IV PUSH: CPT

## 2024-06-24 PROCEDURE — 85027 COMPLETE CBC AUTOMATED: CPT

## 2024-06-24 PROCEDURE — 99285 EMERGENCY DEPT VISIT HI MDM: CPT

## 2024-06-24 PROCEDURE — 80048 BASIC METABOLIC PNL TOTAL CA: CPT

## 2024-06-24 PROCEDURE — 83735 ASSAY OF MAGNESIUM: CPT

## 2024-06-24 PROCEDURE — 93005 ELECTROCARDIOGRAM TRACING: CPT | Performed by: STUDENT IN AN ORGANIZED HEALTH CARE EDUCATION/TRAINING PROGRAM

## 2024-06-24 PROCEDURE — 84484 ASSAY OF TROPONIN QUANT: CPT

## 2024-06-24 PROCEDURE — 85379 FIBRIN DEGRADATION QUANT: CPT

## 2024-06-24 ASSESSMENT — PAIN - FUNCTIONAL ASSESSMENT: PAIN_FUNCTIONAL_ASSESSMENT: 0-10

## 2024-06-24 ASSESSMENT — PAIN DESCRIPTION - DESCRIPTORS: DESCRIPTORS: HEAVINESS;BURNING

## 2024-06-24 ASSESSMENT — PAIN DESCRIPTION - LOCATION: LOCATION: CHEST

## 2024-06-24 ASSESSMENT — PAIN SCALES - GENERAL: PAINLEVEL_OUTOF10: 7

## 2024-06-24 ASSESSMENT — PAIN DESCRIPTION - ORIENTATION: ORIENTATION: RIGHT

## 2024-06-25 ENCOUNTER — APPOINTMENT (OUTPATIENT)
Dept: GENERAL RADIOLOGY | Age: 34
End: 2024-06-25
Payer: COMMERCIAL

## 2024-06-25 VITALS
SYSTOLIC BLOOD PRESSURE: 134 MMHG | DIASTOLIC BLOOD PRESSURE: 93 MMHG | TEMPERATURE: 97.9 F | RESPIRATION RATE: 14 BRPM | OXYGEN SATURATION: 98 % | WEIGHT: 250.22 LBS | HEIGHT: 63 IN | HEART RATE: 52 BPM | BODY MASS INDEX: 44.34 KG/M2

## 2024-06-25 LAB
ANION GAP SERPL CALCULATED.3IONS-SCNC: 9 MMOL/L (ref 9–16)
BUN SERPL-MCNC: 7 MG/DL (ref 6–20)
CALCIUM SERPL-MCNC: 8.6 MG/DL (ref 8.6–10.4)
CHLORIDE SERPL-SCNC: 109 MMOL/L (ref 98–107)
CO2 SERPL-SCNC: 26 MMOL/L (ref 20–31)
CREAT SERPL-MCNC: 0.8 MG/DL (ref 0.5–0.9)
D DIMER PPP FEU-MCNC: <0.27 UG/ML FEU (ref 0–0.57)
EKG ATRIAL RATE: 70 BPM
EKG P AXIS: 60 DEGREES
EKG P-R INTERVAL: 160 MS
EKG Q-T INTERVAL: 392 MS
EKG QRS DURATION: 80 MS
EKG QTC CALCULATION (BAZETT): 423 MS
EKG R AXIS: 23 DEGREES
EKG T AXIS: 20 DEGREES
EKG VENTRICULAR RATE: 70 BPM
ERYTHROCYTE [DISTWIDTH] IN BLOOD BY AUTOMATED COUNT: 14.1 % (ref 11.8–14.4)
GFR, ESTIMATED: >90 ML/MIN/1.73M2
GLUCOSE SERPL-MCNC: 103 MG/DL (ref 74–99)
HCT VFR BLD AUTO: 41.5 % (ref 36.3–47.1)
HGB BLD-MCNC: 13.3 G/DL (ref 11.9–15.1)
MAGNESIUM SERPL-MCNC: 2 MG/DL (ref 1.6–2.6)
MCH RBC QN AUTO: 28.9 PG (ref 25.2–33.5)
MCHC RBC AUTO-ENTMCNC: 32 G/DL (ref 28.4–34.8)
MCV RBC AUTO: 90.2 FL (ref 82.6–102.9)
NRBC BLD-RTO: 0 PER 100 WBC
PLATELET # BLD AUTO: 278 K/UL (ref 138–453)
PMV BLD AUTO: 11.5 FL (ref 8.1–13.5)
POTASSIUM SERPL-SCNC: 3.8 MMOL/L (ref 3.7–5.3)
RBC # BLD AUTO: 4.6 M/UL (ref 3.95–5.11)
SODIUM SERPL-SCNC: 144 MMOL/L (ref 136–145)
TROPONIN I SERPL HS-MCNC: <6 NG/L (ref 0–14)
TROPONIN I SERPL HS-MCNC: <6 NG/L (ref 0–14)
WBC OTHER # BLD: 7.7 K/UL (ref 3.5–11.3)

## 2024-06-25 PROCEDURE — 84484 ASSAY OF TROPONIN QUANT: CPT

## 2024-06-25 PROCEDURE — 71045 X-RAY EXAM CHEST 1 VIEW: CPT

## 2024-06-25 PROCEDURE — 6370000000 HC RX 637 (ALT 250 FOR IP): Performed by: STUDENT IN AN ORGANIZED HEALTH CARE EDUCATION/TRAINING PROGRAM

## 2024-06-25 PROCEDURE — 6360000002 HC RX W HCPCS: Performed by: STUDENT IN AN ORGANIZED HEALTH CARE EDUCATION/TRAINING PROGRAM

## 2024-06-25 RX ORDER — CEPHALEXIN 500 MG/1
500 CAPSULE ORAL 4 TIMES DAILY
Qty: 28 CAPSULE | Refills: 0 | Status: SHIPPED | OUTPATIENT
Start: 2024-06-25 | End: 2024-07-02

## 2024-06-25 RX ORDER — ONDANSETRON 2 MG/ML
4 INJECTION INTRAMUSCULAR; INTRAVENOUS ONCE
Status: DISCONTINUED | OUTPATIENT
Start: 2024-06-25 | End: 2024-06-25

## 2024-06-25 RX ORDER — CEPHALEXIN 500 MG/1
500 CAPSULE ORAL ONCE
Status: COMPLETED | OUTPATIENT
Start: 2024-06-25 | End: 2024-06-25

## 2024-06-25 RX ORDER — KETOROLAC TROMETHAMINE 30 MG/ML
30 INJECTION, SOLUTION INTRAMUSCULAR; INTRAVENOUS ONCE
Status: COMPLETED | OUTPATIENT
Start: 2024-06-25 | End: 2024-06-25

## 2024-06-25 RX ORDER — IBUPROFEN 800 MG/1
800 TABLET ORAL EVERY 8 HOURS PRN
Qty: 21 TABLET | Refills: 0 | Status: SHIPPED | OUTPATIENT
Start: 2024-06-25

## 2024-06-25 RX ORDER — ACETAMINOPHEN 325 MG/1
650 TABLET ORAL EVERY 6 HOURS PRN
Qty: 80 TABLET | Refills: 0 | Status: SHIPPED | OUTPATIENT
Start: 2024-06-25 | End: 2024-07-05

## 2024-06-25 RX ADMIN — KETOROLAC TROMETHAMINE 30 MG: 30 INJECTION, SOLUTION INTRAMUSCULAR; INTRAVENOUS at 01:35

## 2024-06-25 RX ADMIN — CEPHALEXIN 500 MG: 500 CAPSULE ORAL at 01:36

## 2024-06-25 ASSESSMENT — ENCOUNTER SYMPTOMS
ABDOMINAL PAIN: 0
BACK PAIN: 0
CONSTIPATION: 0
COUGH: 0
WHEEZING: 0
DIARRHEA: 0
VOMITING: 0
SHORTNESS OF BREATH: 1
NAUSEA: 0

## 2024-06-25 NOTE — DISCHARGE INSTRUCTIONS
You were diagnosed with right breast cellulitis, we did ultrasound your breast did not see any drainable fluid collection.  Please follow-up with your PCP and you are also provided with the family practice clinic.  Please take your antibiotics as prescribed.    Take your medication as indicated and prescribed.  If you are given an antibiotic then, make sure you get the prescription filled and take the antibiotics until finished.  Drink plenty of water while taking the antibiotics.  Avoid drinking alcohol or drinks that have caffeine in it while taking antibiotics.       For pain use acetaminophen (Tylenol) or ibuprofen (Motrin / Advil), unless prescribed medications that have acetaminophen or ibuprofen (or similar medications) in it.  You can take over the counter acetaminophen tablets (1 - 2 tablets of the 500-mg strength every 6 hours) or ibuprofen tablets (2 tablets every 4 hours).        PLEASE RETURN TO THE EMERGENCY DEPARTMENT IMMEDIATELY for worsening symptoms, white drainage from the wound, redness or streaking, or if you develop any concerning symptoms such as: high fever not relieved by acetaminophen (Tylenol) and/or ibuprofen (Motrin / Advil), chills, shortness of breath, chest pain, feeling of your heart fluttering or racing, persistent nausea and/or vomiting, vomiting up blood, blood in your stool, loss of consciousness, numbness, weakness or tingling in the arms or legs or change in color of the extremities, changes in mental status, persistent headache, blurry vision, loss of bladder / bowel control, unable to follow up with your physician, or other any other care or concern.

## 2024-06-25 NOTE — ED PROVIDER NOTES
[facility] ED     Emergency Department     Faculty Attestation    I performed a history and physical examination of the patient and discussed management with the resident. I reviewed the resident’s note and agree with the documented findings and plan of care. Any areas of disagreement are noted on the chart. I was personally present for the key portions of any procedures. I have documented in the chart those procedures where I was not present during the key portions. I have reviewed the emergency nurses triage note. I agree with the chief complaint, past medical history, past surgical history, allergies, medications, social and family history as documented unless otherwise noted below. For Physician Assistant/ Nurse Practitioner cases/documentation I have personally evaluated this patient and have completed at least one if not all key elements of the E/M (history, physical exam, and MDM). Additional findings are as noted.    12:40 AM EDT    Patient presents with right-sided chest pain and shortness of breath that she has had for the past 2 days.  She says that the pain feels as though it is in her breast and she has a history of multiple cysts to the breast.  Patient also has a history of PE in the past and is not currently on any anticoagulation.  She denies any recent fever, cough, congestion, abdominal pain, nausea or vomiting.  Patient says she has discharge from her nipple but says that that is normal for her and is unchanged since this pain started.  On exam, patient is resting comfortably in the bed.  She is not in respiratory distress.  Lungs clear to auscultation bilaterally and heart sounds are normal.  Abdomen is soft and nontender.  The bilateral calves are nontender nonswollen.  There is some mild erythema and tenderness to the right breast at about the 2 o'clock position.  No nipple discharge seen on my exam.  Will get EKG, chest x-ray, labs and reassess.    EKG 
tablet, R-0Normal      ibuprofen (IBU) 800 MG tablet Take 1 tablet by mouth every 8 hours as needed for Pain, Disp-21 tablet, R-0Normal             William Flood MD  Emergency Medicine Resident    (Please note that portions of thisnote were completed with a voice recognition program.  Efforts were made to edit the dictations but occasionally words are mis-transcribed.)

## 2024-06-25 NOTE — ED NOTES
Patient presents to ED via triage with complaints of SOB, chest pain, and right breast pain for two days. Patient is speaking in complete sentences on room air. Patient states she has hx of PE and cysts in the right breast. Patient states the pain feels like there might be a cyst present. Patient denies abdominal pain, dizziness, or any urinary symptoms. Patient denies taking blood thinners. Patient is A&O x4 and connected to full cardiac monitor. IV placed, EKG done, call light within reach.